# Patient Record
Sex: MALE | URBAN - NONMETROPOLITAN AREA
[De-identification: names, ages, dates, MRNs, and addresses within clinical notes are randomized per-mention and may not be internally consistent; named-entity substitution may affect disease eponyms.]

---

## 2024-08-30 ENCOUNTER — TELEPHONE (OUTPATIENT)
Age: 79
End: 2024-08-30

## 2024-08-30 ENCOUNTER — READMISSION MANAGEMENT (OUTPATIENT)
Dept: CALL CENTER | Facility: HOSPITAL | Age: 79
End: 2024-08-30

## 2024-08-30 NOTE — OUTREACH NOTE
Prep Survey      Flowsheet Row Responses   [facility]   Is LACE score < 7 ? Non-BH Discharge   Eligibility [facility]   Date of Discharge 08/30/24   Discharge diagnosis MUSCLE WEAKNESS   Does the patient have one of the following disease processes/diagnoses(primary or secondary)? Other   Prep survey completed? Yes            Jennifer BERNAL - Registered Nurse

## 2024-09-03 ENCOUNTER — TRANSITIONAL CARE MANAGEMENT TELEPHONE ENCOUNTER (OUTPATIENT)
Dept: CALL CENTER | Facility: HOSPITAL | Age: 79
End: 2024-09-03

## 2024-09-03 NOTE — OUTREACH NOTE
Call Center TCM Note      Flowsheet Row Responses   [facility] patient discharged from? Non-BH   Does the patient have one of the following disease processes/diagnoses(primary or secondary)? Other   TCM attempt successful? No   Unsuccessful attempts Attempt 1   Revoked Reason Phone Issues  [The only number on file belongs to [facility] and the lady that answered states he is not a resident there. There is no other number on file for this patient.]   Does the patient have an appointment with their PCP within 7-14 days of discharge? Yes            Ealine Toscano LPN    9/3/2024, 08:28 EDT